# Patient Record
Sex: FEMALE | Race: WHITE | URBAN - METROPOLITAN AREA
[De-identification: names, ages, dates, MRNs, and addresses within clinical notes are randomized per-mention and may not be internally consistent; named-entity substitution may affect disease eponyms.]

---

## 2018-09-25 ENCOUNTER — OFFICE VISIT (OUTPATIENT)
Dept: ORTHOPEDIC SURGERY | Age: 23
End: 2018-09-25

## 2018-09-25 VITALS — WEIGHT: 180 LBS | BODY MASS INDEX: 27.28 KG/M2 | RESPIRATION RATE: 14 BRPM | HEIGHT: 68 IN

## 2018-09-25 DIAGNOSIS — R52 PAIN: ICD-10-CM

## 2018-09-25 DIAGNOSIS — S62.366A CLOSED NONDISPLACED FRACTURE OF NECK OF FIFTH METACARPAL BONE OF RIGHT HAND, INITIAL ENCOUNTER: Primary | ICD-10-CM

## 2018-09-25 PROCEDURE — 99204 OFFICE O/P NEW MOD 45 MIN: CPT | Performed by: ORTHOPAEDIC SURGERY

## 2018-09-25 PROCEDURE — 26600 TREAT METACARPAL FRACTURE: CPT | Performed by: ORTHOPAEDIC SURGERY

## 2018-09-25 ASSESSMENT — ENCOUNTER SYMPTOMS
EYES NEGATIVE: 1
RESPIRATORY NEGATIVE: 1
HEARTBURN: 1

## 2018-09-25 NOTE — PROGRESS NOTES
Friday with x-rays out of splint and change to brace      Splint Application - right upper extremity:  An appropriately padded, well molded ulnar gutter splint is applied to the patient. The patient reports no immediate discomfort from the splint. Splint care instructions are provided. Splint Care Instructions:  · Inspect the splint regularly. If it becomes cracked or develops soft spots, contact office. · Inspect skin around the splint regularly. If skin becomes red or raw around the splint, contact office. · Do not break off rough edges of the splint or trim the splint. Do not modify the splint. · Do not pull out the padding from the splint. · Do not put foreign objects under the splint. · Do not walk on the splint or place body weight through the splint. · Keep dirt, sand, and powder away from the inside of the splint. · Keep the splint clean and DRY! · Return with any splint problems. She is specifically instructed to contact the office between now & her scheduled appointment if she has concerns related to the fracture. She is welcome to call for an appointment sooner if she has any additional concerns or questions. The patient and all family members present understand the above and desire to proceed with the plan.

## 2022-11-09 ENCOUNTER — HOSPITAL ENCOUNTER (EMERGENCY)
Age: 27
Discharge: HOME | End: 2022-11-09
Attending: EMERGENCY MEDICINE

## 2022-11-09 VITALS
TEMPERATURE: 97.5 F | OXYGEN SATURATION: 97 % | RESPIRATION RATE: 16 BRPM | HEART RATE: 98 BPM | SYSTOLIC BLOOD PRESSURE: 129 MMHG | WEIGHT: 185 LBS | BODY MASS INDEX: 27.4 KG/M2 | DIASTOLIC BLOOD PRESSURE: 78 MMHG | HEIGHT: 69 IN

## 2022-11-09 DIAGNOSIS — K08.89 PAIN, DENTAL: Primary | ICD-10-CM

## 2022-11-09 PROCEDURE — 99283 EMERGENCY DEPT VISIT LOW MDM: CPT

## 2022-11-09 RX ORDER — ACETAMINOPHEN 500 MG
500 TABLET ORAL EVERY 6 HOURS PRN
Qty: 30 TABLET | Refills: 0 | Status: SHIPPED | OUTPATIENT
Start: 2022-11-09

## 2022-11-09 RX ORDER — IBUPROFEN 800 MG/1
800 TABLET ORAL EVERY 8 HOURS PRN
Qty: 30 TABLET | Refills: 0 | Status: SHIPPED | OUTPATIENT
Start: 2022-11-09

## 2022-11-09 RX ORDER — PENICILLIN V POTASSIUM 500 MG/1
500 TABLET ORAL 4 TIMES DAILY
Qty: 40 TABLET | Refills: 0 | Status: SHIPPED | OUTPATIENT
Start: 2022-11-09 | End: 2022-11-19

## 2022-11-09 ASSESSMENT — PAIN DESCRIPTION - LOCATION: LOCATION: TEETH

## 2022-11-09 ASSESSMENT — PAIN DESCRIPTION - DESCRIPTORS: DESCRIPTORS: STABBING;DULL

## 2022-11-09 ASSESSMENT — PAIN - FUNCTIONAL ASSESSMENT: PAIN_FUNCTIONAL_ASSESSMENT: 0-10

## 2022-11-09 ASSESSMENT — PAIN DESCRIPTION - ORIENTATION: ORIENTATION: RIGHT;UPPER

## 2022-11-09 ASSESSMENT — PAIN SCALES - GENERAL: PAINLEVEL_OUTOF10: 2

## 2022-11-09 NOTE — ED TRIAGE NOTES
Arrived ambulatory to room 7-2 for triage. Tolerated without difficulty. Bed in lowest position. Call light given.

## 2022-11-09 NOTE — ED PROVIDER NOTES
Triage Chief Complaint:   Dental Pain (C/o right upper dental pain, wisdom teeth for the past few weeks. Patient states wisdom teeth have been causing pain for the last year but patient believes she has a chip in upper right side causing more pain. Patient does not have health/dental insurance, has not been in contact with a dentist. Patient has been taking Excedrin for pain.)      Ak Chin:  Franky Killian is a 23 y.o. female that presents to the emergency department right upper back dental pain. She states that she still has all of her wisdom teeth. She noticed that the right upper wisdom tooth broke off. When she tried to drink something today she had a sharp shooting pain in that area. She does not have a dentist currently. She states her mother is calling dental clinics right now for her. She states she took Excedrin before coming in and her pain is improved. No fever or chills, trismus. Past Medical History:   Diagnosis Date    Asthma     when younger     History reviewed. No pertinent surgical history.   Family History   Problem Relation Age of Onset    Cancer Mother      Social History     Socioeconomic History    Marital status: Single     Spouse name: Not on file    Number of children: Not on file    Years of education: Not on file    Highest education level: Not on file   Occupational History    Not on file   Tobacco Use    Smoking status: Former    Smokeless tobacco: Never   Vaping Use    Vaping Use: Never used   Substance and Sexual Activity    Alcohol use: Yes     Comment: Occasionally    Drug use: Yes     Frequency: 1.0 times per week     Types: Marijuana Magalys Coad)    Sexual activity: Yes     Partners: Male   Other Topics Concern    Not on file   Social History Narrative    Not on file     Social Determinants of Health     Financial Resource Strain: Not on file   Food Insecurity: Not on file   Transportation Needs: Not on file   Physical Activity: Not on file   Stress: Not on file   Social Connections: Not on file   Intimate Partner Violence: Not on file   Housing Stability: Not on file     No current facility-administered medications for this encounter. Current Outpatient Medications   Medication Sig Dispense Refill    penicillin v potassium (VEETID) 500 MG tablet Take 1 tablet by mouth 4 times daily for 10 days 40 tablet 0    ibuprofen (ADVIL;MOTRIN) 800 MG tablet Take 1 tablet by mouth every 8 hours as needed for Pain 30 tablet 0    acetaminophen (TYLENOL) 500 MG tablet Take 1 tablet by mouth every 6 hours as needed for Pain 30 tablet 0     No Known Allergies  Nursing Notes Reviewed    ROS:  At least 10 systems reviewed and otherwise negative except as in the [place]. Physical Exam:  ED Triage Vitals [11/09/22 1017]   Enc Vitals Group      /78      Heart Rate 98      Resp 16      Temp       Temp src       SpO2 97 %      Weight 185 lb (83.9 kg)      Height 5' 9\" (1.753 m)      Head Circumference       Peak Flow       Pain Score       Pain Loc       Pain Edu? Excl. in [address]? My pulse oximetry interpretation is which is within the normal range    GENERAL APPEARANCE: Awake and alert. Cooperative. No acute distress. HEAD:  Atraumatic. EYES: EOM's grossly intact. ENT: Mucous membranes are moist.  No trismus. Right upper wisdom tooth partially broken, still intact in gumline. No surrounding erythema or cellulitis or drainage  NECK:  Trachea midline. EXTREMITIES: No acute deformities. SKIN: Warm and dry. NEUROLOGICAL: No gross facial drooping. Moves all 4 extremities spontaneously. PSYCHIATRIC: Normal mood. I have reviewed and interpreted all of the currently available lab results from this visit (if applicable):  No results found for this visit on 11/09/22. EKG: (All EKG's are interpreted by myself in the absence of a cardiologist)      MDM:  Patient appears to have a broken right upper wisdom tooth.   There is no obvious signs of an abscess, trismus or systemic infection. I will have her start the patient on antibiotics. She was given a list of dental clinics as well. Encouraged to use anti-inflammatory medication or Tylenol as needed. Given strict return precautions. Clinical Impression:  1. Pain, dental        Disposition Vitals:  [unfilled], [unfilled], [unfilled], [unfilled]    Disposition referral (if applicable):  No follow-up provider specified.     Disposition medications (if applicable):  New Prescriptions    ACETAMINOPHEN (TYLENOL) 500 MG TABLET    Take 1 tablet by mouth every 6 hours as needed for Pain    IBUPROFEN (ADVIL;MOTRIN) 800 MG TABLET    Take 1 tablet by mouth every 8 hours as needed for Pain    PENICILLIN V POTASSIUM (VEETID) 500 MG TABLET    Take 1 tablet by mouth 4 times daily for 10 days         (Please note that portions of this note may have been completed with a voice recognition program. Efforts were made to edit the dictations but occasionally words are mis-transcribed.)    MD Aggie Cline MD  11/09/22 1356

## 2022-11-09 NOTE — DISCHARGE INSTRUCTIONS
Shanae Ochoa Crockett. Bianca Haddad [address]  [address] [facility]  [phone]    [facility] dental  [address] [place] [address]  [phone]    [facility] dental  [address] [address] [address]  [address] [place] [address] MARYAN Ochoa Crockett.   Bianca Haddad [address]  [phone]    [facility]